# Patient Record
Sex: FEMALE | Race: WHITE | NOT HISPANIC OR LATINO
[De-identification: names, ages, dates, MRNs, and addresses within clinical notes are randomized per-mention and may not be internally consistent; named-entity substitution may affect disease eponyms.]

---

## 2017-01-06 RX ORDER — NORGESTIMATE AND ETHINYL ESTRADIOL 0.25-0.035
0.25-35 KIT ORAL
Qty: 30 | Refills: 11 | Status: ACTIVE | COMMUNITY
Start: 2017-01-06 | End: 1900-01-01

## 2017-02-06 ENCOUNTER — APPOINTMENT (OUTPATIENT)
Dept: OBGYN | Facility: CLINIC | Age: 27
End: 2017-02-06

## 2017-02-06 VITALS
WEIGHT: 108 LBS | HEIGHT: 63 IN | BODY MASS INDEX: 19.14 KG/M2 | SYSTOLIC BLOOD PRESSURE: 115 MMHG | DIASTOLIC BLOOD PRESSURE: 72 MMHG

## 2017-02-06 DIAGNOSIS — Z30.431 ENCOUNTER FOR ROUTINE CHECKING OF INTRAUTERINE CONTRACEPTIVE DEVICE: ICD-10-CM

## 2017-05-16 ENCOUNTER — RESULT REVIEW (OUTPATIENT)
Age: 27
End: 2017-05-16

## 2017-10-17 ENCOUNTER — APPOINTMENT (OUTPATIENT)
Dept: OTOLARYNGOLOGY | Facility: CLINIC | Age: 27
End: 2017-10-17
Payer: COMMERCIAL

## 2017-10-17 VITALS
DIASTOLIC BLOOD PRESSURE: 72 MMHG | WEIGHT: 108 LBS | HEIGHT: 63 IN | HEART RATE: 74 BPM | SYSTOLIC BLOOD PRESSURE: 104 MMHG | BODY MASS INDEX: 19.14 KG/M2

## 2017-10-17 DIAGNOSIS — J34.89 OTHER SPECIFIED DISORDERS OF NOSE AND NASAL SINUSES: ICD-10-CM

## 2017-10-17 PROCEDURE — 99204 OFFICE O/P NEW MOD 45 MIN: CPT | Mod: 25

## 2017-10-17 PROCEDURE — 31231 NASAL ENDOSCOPY DX: CPT

## 2017-10-17 RX ORDER — AMOXICILLIN 875 MG/1
875 TABLET, FILM COATED ORAL TWICE DAILY
Qty: 20 | Refills: 0 | Status: ACTIVE | COMMUNITY
Start: 2017-10-17 | End: 1900-01-01

## 2017-10-17 RX ORDER — AMOXICILLIN AND CLAVULANATE POTASSIUM 875; 125 MG/1; MG/1
875-125 TABLET, COATED ORAL
Qty: 14 | Refills: 0 | Status: ACTIVE | COMMUNITY
Start: 2017-08-14

## 2017-10-17 RX ORDER — AZITHROMYCIN 250 MG/1
250 TABLET, FILM COATED ORAL
Qty: 6 | Refills: 0 | Status: ACTIVE | COMMUNITY
Start: 2017-06-15

## 2017-10-17 RX ORDER — FLUTICASONE PROPIONATE 50 UG/1
50 SPRAY, METERED NASAL DAILY
Qty: 16 | Refills: 3 | Status: ACTIVE | COMMUNITY
Start: 2017-10-17 | End: 1900-01-01

## 2017-10-17 RX ORDER — LEVONORGESTREL 52 MG/1
INTRAUTERINE DEVICE INTRAUTERINE
Refills: 0 | Status: ACTIVE | COMMUNITY

## 2017-10-17 RX ORDER — AMOXICILLIN 500 MG/1
500 CAPSULE ORAL
Qty: 21 | Refills: 0 | Status: ACTIVE | COMMUNITY
Start: 2017-06-15

## 2017-10-17 RX ORDER — METHYLPREDNISOLONE 4 MG/1
4 TABLET ORAL
Qty: 1 | Refills: 0 | Status: ACTIVE | COMMUNITY
Start: 2017-10-17 | End: 1900-01-01

## 2017-11-20 ENCOUNTER — APPOINTMENT (OUTPATIENT)
Dept: OTOLARYNGOLOGY | Facility: CLINIC | Age: 27
End: 2017-11-20
Payer: COMMERCIAL

## 2017-11-20 VITALS
DIASTOLIC BLOOD PRESSURE: 73 MMHG | SYSTOLIC BLOOD PRESSURE: 104 MMHG | WEIGHT: 108 LBS | HEART RATE: 88 BPM | HEIGHT: 63 IN | BODY MASS INDEX: 19.14 KG/M2

## 2017-11-20 DIAGNOSIS — J34.2 DEVIATED NASAL SEPTUM: ICD-10-CM

## 2017-11-20 DIAGNOSIS — J32.4 CHRONIC PANSINUSITIS: ICD-10-CM

## 2017-11-20 PROCEDURE — 31231 NASAL ENDOSCOPY DX: CPT

## 2017-11-20 PROCEDURE — 99214 OFFICE O/P EST MOD 30 MIN: CPT | Mod: 25

## 2018-05-15 ENCOUNTER — RESULT REVIEW (OUTPATIENT)
Age: 28
End: 2018-05-15

## 2019-09-18 ENCOUNTER — RESULT REVIEW (OUTPATIENT)
Age: 29
End: 2019-09-18

## 2020-02-14 ENCOUNTER — ASOB RESULT (OUTPATIENT)
Age: 30
End: 2020-02-14

## 2020-02-14 ENCOUNTER — APPOINTMENT (OUTPATIENT)
Dept: ANTEPARTUM | Facility: CLINIC | Age: 30
End: 2020-02-14
Payer: COMMERCIAL

## 2020-02-14 PROCEDURE — 76811 OB US DETAILED SNGL FETUS: CPT

## 2020-04-27 ENCOUNTER — APPOINTMENT (OUTPATIENT)
Dept: MATERNAL FETAL MEDICINE | Facility: CLINIC | Age: 30
End: 2020-04-27
Payer: COMMERCIAL

## 2020-04-27 DIAGNOSIS — O24.419 GESTATIONAL DIABETES MELLITUS IN PREGNANCY, UNSPECIFIED CONTROL: ICD-10-CM

## 2020-04-27 PROCEDURE — G0109 DIAB MANAGE TRN IND/GROUP: CPT | Mod: 95

## 2020-04-27 RX ORDER — BLOOD-GLUCOSE METER
W/DEVICE KIT MISCELLANEOUS
Qty: 1 | Refills: 0 | Status: ACTIVE | COMMUNITY
Start: 2020-04-27 | End: 1900-01-01

## 2020-05-11 ENCOUNTER — APPOINTMENT (OUTPATIENT)
Dept: MATERNAL FETAL MEDICINE | Facility: CLINIC | Age: 30
End: 2020-05-11
Payer: COMMERCIAL

## 2020-05-11 PROCEDURE — 98967 PH1 ASSMT&MGMT NQHP 11-20: CPT

## 2020-05-22 ENCOUNTER — TRANSCRIPTION ENCOUNTER (OUTPATIENT)
Age: 30
End: 2020-05-22

## 2020-05-22 RX ORDER — BLOOD-GLUCOSE METER
KIT MISCELLANEOUS
Qty: 2 | Refills: 2 | Status: ACTIVE | COMMUNITY
Start: 2020-04-27 | End: 1900-01-01

## 2020-05-22 RX ORDER — LANCETS 33 GAUGE
EACH MISCELLANEOUS
Qty: 1 | Refills: 3 | Status: ACTIVE | COMMUNITY
Start: 2020-04-27 | End: 1900-01-01

## 2020-06-01 ENCOUNTER — ASOB RESULT (OUTPATIENT)
Age: 30
End: 2020-06-01

## 2020-06-01 ENCOUNTER — APPOINTMENT (OUTPATIENT)
Dept: MATERNAL FETAL MEDICINE | Facility: CLINIC | Age: 30
End: 2020-06-01
Payer: COMMERCIAL

## 2020-06-01 PROCEDURE — 98967 PH1 ASSMT&MGMT NQHP 11-20: CPT

## 2020-06-09 ENCOUNTER — APPOINTMENT (OUTPATIENT)
Dept: ANTEPARTUM | Facility: CLINIC | Age: 30
End: 2020-06-09
Payer: COMMERCIAL

## 2020-06-09 ENCOUNTER — ASOB RESULT (OUTPATIENT)
Age: 30
End: 2020-06-09

## 2020-06-09 PROCEDURE — 76819 FETAL BIOPHYS PROFIL W/O NST: CPT

## 2020-06-09 PROCEDURE — 76816 OB US FOLLOW-UP PER FETUS: CPT

## 2020-06-22 ENCOUNTER — INPATIENT (INPATIENT)
Facility: HOSPITAL | Age: 30
LOS: 1 days | Discharge: HOME | End: 2020-06-24
Attending: OBSTETRICS & GYNECOLOGY | Admitting: OBSTETRICS & GYNECOLOGY
Payer: COMMERCIAL

## 2020-06-22 VITALS
RESPIRATION RATE: 18 BRPM | SYSTOLIC BLOOD PRESSURE: 107 MMHG | HEART RATE: 81 BPM | TEMPERATURE: 99 F | OXYGEN SATURATION: 100 % | DIASTOLIC BLOOD PRESSURE: 58 MMHG

## 2020-06-22 DIAGNOSIS — Z3A.00 WEEKS OF GESTATION OF PREGNANCY NOT SPECIFIED: ICD-10-CM

## 2020-06-22 DIAGNOSIS — O26.899 OTHER SPECIFIED PREGNANCY RELATED CONDITIONS, UNSPECIFIED TRIMESTER: ICD-10-CM

## 2020-06-22 DIAGNOSIS — Z34.80 ENCOUNTER FOR SUPERVISION OF OTHER NORMAL PREGNANCY, UNSPECIFIED TRIMESTER: ICD-10-CM

## 2020-06-22 LAB
BASOPHILS # BLD AUTO: 0.04 K/UL — SIGNIFICANT CHANGE UP (ref 0–0.2)
BASOPHILS NFR BLD AUTO: 0.4 % — SIGNIFICANT CHANGE UP (ref 0–2)
BLD GP AB SCN SERPL QL: NEGATIVE — SIGNIFICANT CHANGE UP
EOSINOPHIL # BLD AUTO: 0.05 K/UL — SIGNIFICANT CHANGE UP (ref 0–0.5)
EOSINOPHIL NFR BLD AUTO: 0.5 % — SIGNIFICANT CHANGE UP (ref 0–6)
GLUCOSE BLDC GLUCOMTR-MCNC: 72 MG/DL — SIGNIFICANT CHANGE UP (ref 70–99)
GLUCOSE BLDC GLUCOMTR-MCNC: 97 MG/DL — SIGNIFICANT CHANGE UP (ref 70–99)
HCT VFR BLD CALC: 36.7 % — SIGNIFICANT CHANGE UP (ref 34.5–45)
HGB BLD-MCNC: 12.5 G/DL — SIGNIFICANT CHANGE UP (ref 11.5–15.5)
IMM GRANULOCYTES NFR BLD AUTO: 0.7 % — SIGNIFICANT CHANGE UP (ref 0–1.5)
LYMPHOCYTES # BLD AUTO: 1.99 K/UL — SIGNIFICANT CHANGE UP (ref 1–3.3)
LYMPHOCYTES # BLD AUTO: 19.2 % — SIGNIFICANT CHANGE UP (ref 13–44)
MCHC RBC-ENTMCNC: 29.9 PG — SIGNIFICANT CHANGE UP (ref 27–34)
MCHC RBC-ENTMCNC: 34.1 GM/DL — SIGNIFICANT CHANGE UP (ref 32–36)
MCV RBC AUTO: 87.8 FL — SIGNIFICANT CHANGE UP (ref 80–100)
MONOCYTES # BLD AUTO: 0.72 K/UL — SIGNIFICANT CHANGE UP (ref 0–0.9)
MONOCYTES NFR BLD AUTO: 7 % — SIGNIFICANT CHANGE UP (ref 2–14)
NEUTROPHILS # BLD AUTO: 7.47 K/UL — HIGH (ref 1.8–7.4)
NEUTROPHILS NFR BLD AUTO: 72.2 % — SIGNIFICANT CHANGE UP (ref 43–77)
NRBC # BLD: 0 /100 WBCS — SIGNIFICANT CHANGE UP (ref 0–0)
PLATELET # BLD AUTO: 148 K/UL — LOW (ref 150–400)
RBC # BLD: 4.18 M/UL — SIGNIFICANT CHANGE UP (ref 3.8–5.2)
RBC # FLD: 14.4 % — SIGNIFICANT CHANGE UP (ref 10.3–14.5)
RH IG SCN BLD-IMP: POSITIVE — SIGNIFICANT CHANGE UP
SARS-COV-2 RNA SPEC QL NAA+PROBE: SIGNIFICANT CHANGE UP
WBC # BLD: 10.34 K/UL — SIGNIFICANT CHANGE UP (ref 3.8–10.5)
WBC # FLD AUTO: 10.34 K/UL — SIGNIFICANT CHANGE UP (ref 3.8–10.5)

## 2020-06-22 RX ORDER — KETOROLAC TROMETHAMINE 30 MG/ML
30 SYRINGE (ML) INJECTION ONCE
Refills: 0 | Status: DISCONTINUED | OUTPATIENT
Start: 2020-06-22 | End: 2020-06-23

## 2020-06-22 RX ORDER — HYDROCORTISONE 1 %
1 OINTMENT (GRAM) TOPICAL EVERY 6 HOURS
Refills: 0 | Status: DISCONTINUED | OUTPATIENT
Start: 2020-06-22 | End: 2020-06-24

## 2020-06-22 RX ORDER — PRAMOXINE HYDROCHLORIDE 150 MG/15G
1 AEROSOL, FOAM RECTAL EVERY 4 HOURS
Refills: 0 | Status: DISCONTINUED | OUTPATIENT
Start: 2020-06-22 | End: 2020-06-24

## 2020-06-22 RX ORDER — TETANUS TOXOID, REDUCED DIPHTHERIA TOXOID AND ACELLULAR PERTUSSIS VACCINE, ADSORBED 5; 2.5; 8; 8; 2.5 [IU]/.5ML; [IU]/.5ML; UG/.5ML; UG/.5ML; UG/.5ML
0.5 SUSPENSION INTRAMUSCULAR ONCE
Refills: 0 | Status: DISCONTINUED | OUTPATIENT
Start: 2020-06-22 | End: 2020-06-24

## 2020-06-22 RX ORDER — SODIUM CHLORIDE 9 MG/ML
1000 INJECTION INTRAMUSCULAR; INTRAVENOUS; SUBCUTANEOUS
Refills: 0 | Status: DISCONTINUED | OUTPATIENT
Start: 2020-06-22 | End: 2020-06-22

## 2020-06-22 RX ORDER — SODIUM CHLORIDE 9 MG/ML
3 INJECTION INTRAMUSCULAR; INTRAVENOUS; SUBCUTANEOUS EVERY 8 HOURS
Refills: 0 | Status: DISCONTINUED | OUTPATIENT
Start: 2020-06-22 | End: 2020-06-24

## 2020-06-22 RX ORDER — SODIUM CHLORIDE 9 MG/ML
1000 INJECTION, SOLUTION INTRAVENOUS
Refills: 0 | Status: DISCONTINUED | OUTPATIENT
Start: 2020-06-22 | End: 2020-06-22

## 2020-06-22 RX ORDER — OXYTOCIN 10 UNIT/ML
333.33 VIAL (ML) INJECTION
Qty: 20 | Refills: 0 | Status: DISCONTINUED | OUTPATIENT
Start: 2020-06-22 | End: 2020-06-24

## 2020-06-22 RX ORDER — SIMETHICONE 80 MG/1
80 TABLET, CHEWABLE ORAL EVERY 4 HOURS
Refills: 0 | Status: DISCONTINUED | OUTPATIENT
Start: 2020-06-22 | End: 2020-06-24

## 2020-06-22 RX ORDER — BENZOCAINE 10 %
1 GEL (GRAM) MUCOUS MEMBRANE EVERY 6 HOURS
Refills: 0 | Status: DISCONTINUED | OUTPATIENT
Start: 2020-06-22 | End: 2020-06-24

## 2020-06-22 RX ORDER — DIBUCAINE 1 %
1 OINTMENT (GRAM) RECTAL EVERY 6 HOURS
Refills: 0 | Status: DISCONTINUED | OUTPATIENT
Start: 2020-06-22 | End: 2020-06-24

## 2020-06-22 RX ORDER — OXYCODONE HYDROCHLORIDE 5 MG/1
5 TABLET ORAL ONCE
Refills: 0 | Status: DISCONTINUED | OUTPATIENT
Start: 2020-06-22 | End: 2020-06-24

## 2020-06-22 RX ORDER — OXYTOCIN 10 UNIT/ML
333.33 VIAL (ML) INJECTION
Qty: 20 | Refills: 0 | Status: DISCONTINUED | OUTPATIENT
Start: 2020-06-22 | End: 2020-06-22

## 2020-06-22 RX ORDER — LANOLIN
1 OINTMENT (GRAM) TOPICAL EVERY 6 HOURS
Refills: 0 | Status: DISCONTINUED | OUTPATIENT
Start: 2020-06-22 | End: 2020-06-24

## 2020-06-22 RX ORDER — OXYTOCIN 10 UNIT/ML
6 VIAL (ML) INJECTION
Qty: 30 | Refills: 0 | Status: DISCONTINUED | OUTPATIENT
Start: 2020-06-22 | End: 2020-06-22

## 2020-06-22 RX ORDER — AMPICILLIN TRIHYDRATE 250 MG
2 CAPSULE ORAL ONCE
Refills: 0 | Status: COMPLETED | OUTPATIENT
Start: 2020-06-22 | End: 2020-06-22

## 2020-06-22 RX ORDER — IBUPROFEN 200 MG
600 TABLET ORAL EVERY 6 HOURS
Refills: 0 | Status: COMPLETED | OUTPATIENT
Start: 2020-06-22 | End: 2021-05-21

## 2020-06-22 RX ORDER — AMPICILLIN TRIHYDRATE 250 MG
1 CAPSULE ORAL EVERY 4 HOURS
Refills: 0 | Status: DISCONTINUED | OUTPATIENT
Start: 2020-06-22 | End: 2020-06-22

## 2020-06-22 RX ORDER — AER TRAVELER 0.5 G/1
1 SOLUTION RECTAL; TOPICAL EVERY 4 HOURS
Refills: 0 | Status: DISCONTINUED | OUTPATIENT
Start: 2020-06-22 | End: 2020-06-24

## 2020-06-22 RX ORDER — DIPHENHYDRAMINE HCL 50 MG
25 CAPSULE ORAL EVERY 6 HOURS
Refills: 0 | Status: DISCONTINUED | OUTPATIENT
Start: 2020-06-22 | End: 2020-06-24

## 2020-06-22 RX ORDER — ACETAMINOPHEN 500 MG
975 TABLET ORAL
Refills: 0 | Status: DISCONTINUED | OUTPATIENT
Start: 2020-06-22 | End: 2020-06-24

## 2020-06-22 RX ORDER — CITRIC ACID/SODIUM CITRATE 300-500 MG
15 SOLUTION, ORAL ORAL EVERY 6 HOURS
Refills: 0 | Status: DISCONTINUED | OUTPATIENT
Start: 2020-06-22 | End: 2020-06-22

## 2020-06-22 RX ORDER — OXYCODONE HYDROCHLORIDE 5 MG/1
5 TABLET ORAL
Refills: 0 | Status: DISCONTINUED | OUTPATIENT
Start: 2020-06-22 | End: 2020-06-24

## 2020-06-22 RX ORDER — MAGNESIUM HYDROXIDE 400 MG/1
30 TABLET, CHEWABLE ORAL
Refills: 0 | Status: DISCONTINUED | OUTPATIENT
Start: 2020-06-22 | End: 2020-06-24

## 2020-06-22 RX ADMIN — SODIUM CHLORIDE 125 MILLILITER(S): 9 INJECTION, SOLUTION INTRAVENOUS at 21:08

## 2020-06-22 RX ADMIN — Medication 216 GRAM(S): at 19:17

## 2020-06-22 RX ADMIN — Medication 6 MILLIUNIT(S)/MIN: at 20:20

## 2020-06-22 RX ADMIN — SODIUM CHLORIDE 125 MILLILITER(S): 9 INJECTION INTRAMUSCULAR; INTRAVENOUS; SUBCUTANEOUS at 21:08

## 2020-06-23 ENCOUNTER — APPOINTMENT (OUTPATIENT)
Dept: ANTEPARTUM | Facility: CLINIC | Age: 30
End: 2020-06-23

## 2020-06-23 LAB
SARS-COV-2 IGG SERPL QL IA: NEGATIVE — SIGNIFICANT CHANGE UP
SARS-COV-2 IGM SERPL IA-ACNC: 0.14 INDEX — SIGNIFICANT CHANGE UP
T PALLIDUM AB TITR SER: NEGATIVE — SIGNIFICANT CHANGE UP

## 2020-06-23 RX ORDER — IBUPROFEN 200 MG
600 TABLET ORAL EVERY 6 HOURS
Refills: 0 | Status: DISCONTINUED | OUTPATIENT
Start: 2020-06-23 | End: 2020-06-24

## 2020-06-23 RX ADMIN — Medication 1000 MILLIUNIT(S)/MIN: at 00:10

## 2020-06-23 RX ADMIN — Medication 600 MILLIGRAM(S): at 14:57

## 2020-06-23 RX ADMIN — Medication 1 TABLET(S): at 14:56

## 2020-06-23 RX ADMIN — Medication 600 MILLIGRAM(S): at 20:47

## 2020-06-23 RX ADMIN — Medication 975 MILLIGRAM(S): at 12:04

## 2020-06-23 RX ADMIN — Medication 975 MILLIGRAM(S): at 17:56

## 2020-06-23 RX ADMIN — Medication 600 MILLIGRAM(S): at 08:43

## 2020-06-23 RX ADMIN — Medication 975 MILLIGRAM(S): at 05:10

## 2020-06-23 RX ADMIN — Medication 30 MILLIGRAM(S): at 00:26

## 2020-06-23 NOTE — DIETITIAN INITIAL EVALUATION ADULT. - PERTINENT MEDS FT
MEDICATIONS  (STANDING):  acetaminophen   Tablet .. 975 milliGRAM(s) Oral <User Schedule>  diphtheria/tetanus/pertussis (acellular) Vaccine (ADAcel) 0.5 milliLiter(s) IntraMuscular once  ibuprofen  Tablet. 600 milliGRAM(s) Oral every 6 hours  oxytocin Infusion 333.333 milliUNIT(s)/Min (1000 mL/Hr) IV Continuous <Continuous>  prenatal multivitamin 1 Tablet(s) Oral daily  sodium chloride 0.9% lock flush 3 milliLiter(s) IV Push every 8 hours    MEDICATIONS  (PRN):  benzocaine 20%/menthol 0.5% Spray 1 Spray(s) Topical every 6 hours PRN for Perineal discomfort  dibucaine 1% Ointment 1 Application(s) Topical every 6 hours PRN Perineal discomfort  diphenhydrAMINE 25 milliGRAM(s) Oral every 6 hours PRN Pruritus  hydrocortisone 1% Cream 1 Application(s) Topical every 6 hours PRN Moderate Pain (4-6)  lanolin Ointment 1 Application(s) Topical every 6 hours PRN nipple soreness  magnesium hydroxide Suspension 30 milliLiter(s) Oral two times a day PRN Constipation  oxyCODONE    IR 5 milliGRAM(s) Oral every 3 hours PRN Moderate to Severe Pain (4-10)  oxyCODONE    IR 5 milliGRAM(s) Oral once PRN Moderate to Severe Pain (4-10)  pramoxine 1%/zinc 5% Cream 1 Application(s) Topical every 4 hours PRN Moderate Pain (4-6)  simethicone 80 milliGRAM(s) Chew every 4 hours PRN Gas  witch hazel Pads 1 Application(s) Topical every 4 hours PRN Perineal discomfort

## 2020-06-23 NOTE — DIETITIAN INITIAL EVALUATION ADULT. - OTHER INFO
Pt is 28yo  s/p  at 39.1 weeks. Plans on breast feeding  male.  Good appetite reported. No c/o nausea, vomiting, diarrhea, or constipation. No BM since delivery.  Pt endorses following a CSTCHOGDM therapeutic diet while pregnant. No medications for BG control during pregnancy. Pt reports this was her first pregnancy with GDM.  Pt endorses taking a prenatal multivitamin while pregnant. NFKA reported.    Nutrition education provided: Pt educated on postpartum dietary recommendations including: risk of development of T2DM, ways to reduce risk of developing T2DM and reinforced importance of DM screening 4-12 weeks postpartum. Pt verbalized good understanding. Written materials left at bedside.

## 2020-06-23 NOTE — DIETITIAN INITIAL EVALUATION ADULT. - PHYSICAL APPEARANCE
other (specify)/well nourished Height: 63 inches, Weight: 105 pounds (pre-pregnancy)  BMI: 18.6 kg/m2 IBW: 115 pounds (+/-10%), %IBW: 91%  Pertinent Info: No edema noted, no pressure injuries noted at this time in nursing flow sheet.

## 2020-06-23 NOTE — PROGRESS NOTE ADULT - SUBJECTIVE AND OBJECTIVE BOX
Postpartum Note- PPD#1    Allergies    No Known Allergies    Intolerances          RPR Negative  Blood Type  O  --  Positive  Rubella immune    S:Patient is a  29y         PPD#1         S/P      Patient w/o complaints, pain is controlled.    Pt is OOB, tolerating PO, passing flatus. Lochia WNL.     O:  Vital Signs Last 24 Hrs  T(C): 36.7 (2020 03:45), Max: 37.1 (2020 23:40)  T(F): 98.1 (2020 03:45), Max: 98.8 (2020 23:40)  HR: 62 (2020 03:45) (58 - 81)  BP: 106/67 (2020 03:45) (106/67 - 125/64)  BP(mean): 93 (2020 01:40) (82 - 93)  RR: 18 (2020 03:45) (16 - 20)  SpO2: 100% (2020 03:45) (98% - 100%)     Gen: NAD  Abdomen: Soft, nontender, non-distended, fundus firm.  Lochia WNL  Ext: Neg edema, Neg calf tenderness    LABS:    Hemoglobin: 12.5 g/dL (20 @ 19:31)      Hematocrit: 36.7 % (20 @ 19:31)

## 2020-06-24 ENCOUNTER — TRANSCRIPTION ENCOUNTER (OUTPATIENT)
Age: 30
End: 2020-06-24

## 2020-06-24 VITALS
OXYGEN SATURATION: 98 % | DIASTOLIC BLOOD PRESSURE: 63 MMHG | TEMPERATURE: 98 F | HEART RATE: 58 BPM | RESPIRATION RATE: 1 BRPM | SYSTOLIC BLOOD PRESSURE: 100 MMHG

## 2020-06-24 PROCEDURE — 86769 SARS-COV-2 COVID-19 ANTIBODY: CPT

## 2020-06-24 PROCEDURE — 59025 FETAL NON-STRESS TEST: CPT

## 2020-06-24 PROCEDURE — 82962 GLUCOSE BLOOD TEST: CPT

## 2020-06-24 PROCEDURE — 85027 COMPLETE CBC AUTOMATED: CPT

## 2020-06-24 PROCEDURE — G0463: CPT

## 2020-06-24 PROCEDURE — 86780 TREPONEMA PALLIDUM: CPT

## 2020-06-24 PROCEDURE — 59050 FETAL MONITOR W/REPORT: CPT

## 2020-06-24 RX ORDER — ACETAMINOPHEN 500 MG
3 TABLET ORAL
Qty: 0 | Refills: 0 | DISCHARGE
Start: 2020-06-24

## 2020-06-24 RX ORDER — IBUPROFEN 200 MG
1 TABLET ORAL
Qty: 0 | Refills: 0 | DISCHARGE
Start: 2020-06-24

## 2020-06-24 RX ADMIN — Medication 600 MILLIGRAM(S): at 03:58

## 2020-06-24 RX ADMIN — Medication 600 MILLIGRAM(S): at 09:45

## 2020-06-24 RX ADMIN — Medication 975 MILLIGRAM(S): at 00:02

## 2020-06-24 NOTE — DISCHARGE NOTE OB - PATIENT PORTAL LINK FT
You can access the FollowMyHealth Patient Portal offered by [facility] by registering at the following website: [email or web address]. By joining [facility]’s FollowMyHealth portal, you will also be able to view your health information using other applications (apps) compatible with our system.

## 2020-06-24 NOTE — DISCHARGE NOTE OB - CARE PLAN
Principal Discharge DX:	Vaginal delivery  Goal:	recovery  Assessment and plan of treatment:	return to baseline health, regular diet, pain control, follow up with Dr Perez

## 2020-06-24 NOTE — PROGRESS NOTE ADULT - ASSESSMENT
Per Dr. Perez-  Abdomen: Soft, nontender, non-distended, fundus firm.  Vaginal: Lochia WNL.   Ext: Neg edema, Neg calf tenderness

## 2020-06-24 NOTE — PROGRESS NOTE ADULT - SUBJECTIVE AND OBJECTIVE BOX
Postpartum Note- PPD#2      S:Patient w/o complaints, pain is controlled.    Pt is OOB, tolerating PO, voiding. Vaginal bleeding mild to moderate.       O:  Vital Signs Last 24 Hrs  T(C): 36.7 (24 Jun 2020 05:17), Max: 36.8 (23 Jun 2020 18:00)  T(F): 98 (24 Jun 2020 05:17), Max: 98.2 (23 Jun 2020 18:00)  HR: 58 (24 Jun 2020 05:17) (58 - 67)  BP: 100/63 (24 Jun 2020 05:17) (92/60 - 100/63)  BP(mean): --  RR: 1 (24 Jun 2020 05:17) (1 - 18)  SpO2: 98% (24 Jun 2020 05:17) (97% - 99%)         LABS:    Hemoglobin: 12.5 g/dL (06-22 @ 19:31)      Hematocrit: 36.7 % (06-22 @ 19:31)

## 2020-06-24 NOTE — DISCHARGE NOTE OB - CARE PROVIDER_API CALL
Mckayla Perez  OBSTETRICS AND GYNECOLOGY  [address]  [address]  [place] [address]  Phone: [phone]  Fax: [phone]  Follow Up Time:

## 2021-03-10 ENCOUNTER — RESULT REVIEW (OUTPATIENT)
Age: 31
End: 2021-03-10

## 2022-06-15 ENCOUNTER — RESULT REVIEW (OUTPATIENT)
Age: 32
End: 2022-06-15

## 2022-12-21 ENCOUNTER — APPOINTMENT (OUTPATIENT)
Dept: ANTEPARTUM | Facility: CLINIC | Age: 32
End: 2022-12-21

## 2022-12-21 ENCOUNTER — ASOB RESULT (OUTPATIENT)
Age: 32
End: 2022-12-21

## 2022-12-21 PROCEDURE — 76811 OB US DETAILED SNGL FETUS: CPT

## 2023-04-28 ENCOUNTER — INPATIENT (INPATIENT)
Facility: HOSPITAL | Age: 33
LOS: 1 days | Discharge: HOME | End: 2023-04-30
Attending: OBSTETRICS & GYNECOLOGY | Admitting: OBSTETRICS & GYNECOLOGY
Payer: COMMERCIAL

## 2023-04-28 VITALS
DIASTOLIC BLOOD PRESSURE: 74 MMHG | RESPIRATION RATE: 18 BRPM | HEART RATE: 86 BPM | SYSTOLIC BLOOD PRESSURE: 119 MMHG | TEMPERATURE: 99 F

## 2023-04-28 DIAGNOSIS — O26.899 OTHER SPECIFIED PREGNANCY RELATED CONDITIONS, UNSPECIFIED TRIMESTER: ICD-10-CM

## 2023-04-28 DIAGNOSIS — Z34.80 ENCOUNTER FOR SUPERVISION OF OTHER NORMAL PREGNANCY, UNSPECIFIED TRIMESTER: ICD-10-CM

## 2023-04-28 LAB
BASOPHILS # BLD AUTO: 0.07 K/UL — SIGNIFICANT CHANGE UP (ref 0–0.2)
BASOPHILS NFR BLD AUTO: 0.5 % — SIGNIFICANT CHANGE UP (ref 0–2)
BLD GP AB SCN SERPL QL: NEGATIVE — SIGNIFICANT CHANGE UP
EOSINOPHIL # BLD AUTO: 0.19 K/UL — SIGNIFICANT CHANGE UP (ref 0–0.5)
EOSINOPHIL NFR BLD AUTO: 1.4 % — SIGNIFICANT CHANGE UP (ref 0–6)
HCT VFR BLD CALC: 38.3 % — SIGNIFICANT CHANGE UP (ref 34.5–45)
HGB BLD-MCNC: 13.2 G/DL — SIGNIFICANT CHANGE UP (ref 11.5–15.5)
IMM GRANULOCYTES NFR BLD AUTO: 1.2 % — HIGH (ref 0–0.9)
LYMPHOCYTES # BLD AUTO: 15.8 % — SIGNIFICANT CHANGE UP (ref 13–44)
LYMPHOCYTES # BLD AUTO: 2.21 K/UL — SIGNIFICANT CHANGE UP (ref 1–3.3)
MCHC RBC-ENTMCNC: 31.4 PG — SIGNIFICANT CHANGE UP (ref 27–34)
MCHC RBC-ENTMCNC: 34.5 GM/DL — SIGNIFICANT CHANGE UP (ref 32–36)
MCV RBC AUTO: 91.2 FL — SIGNIFICANT CHANGE UP (ref 80–100)
MONOCYTES # BLD AUTO: 0.93 K/UL — HIGH (ref 0–0.9)
MONOCYTES NFR BLD AUTO: 6.7 % — SIGNIFICANT CHANGE UP (ref 2–14)
NEUTROPHILS # BLD AUTO: 10.39 K/UL — HIGH (ref 1.8–7.4)
NEUTROPHILS NFR BLD AUTO: 74.4 % — SIGNIFICANT CHANGE UP (ref 43–77)
NRBC # BLD: 0 /100 WBCS — SIGNIFICANT CHANGE UP (ref 0–0)
PLATELET # BLD AUTO: 188 K/UL — SIGNIFICANT CHANGE UP (ref 150–400)
RBC # BLD: 4.2 M/UL — SIGNIFICANT CHANGE UP (ref 3.8–5.2)
RBC # FLD: 13.7 % — SIGNIFICANT CHANGE UP (ref 10.3–14.5)
RH IG SCN BLD-IMP: POSITIVE — SIGNIFICANT CHANGE UP
WBC # BLD: 13.96 K/UL — HIGH (ref 3.8–10.5)
WBC # FLD AUTO: 13.96 K/UL — HIGH (ref 3.8–10.5)

## 2023-04-28 RX ORDER — SODIUM CHLORIDE 9 MG/ML
1000 INJECTION, SOLUTION INTRAVENOUS
Refills: 0 | Status: DISCONTINUED | OUTPATIENT
Start: 2023-04-28 | End: 2023-04-29

## 2023-04-28 RX ORDER — CHLORHEXIDINE GLUCONATE 213 G/1000ML
1 SOLUTION TOPICAL ONCE
Refills: 0 | Status: DISCONTINUED | OUTPATIENT
Start: 2023-04-28 | End: 2023-04-29

## 2023-04-28 RX ORDER — AMPICILLIN TRIHYDRATE 250 MG
1 CAPSULE ORAL EVERY 4 HOURS
Refills: 0 | Status: DISCONTINUED | OUTPATIENT
Start: 2023-04-28 | End: 2023-04-29

## 2023-04-28 RX ORDER — AMPICILLIN TRIHYDRATE 250 MG
2 CAPSULE ORAL ONCE
Refills: 0 | Status: COMPLETED | OUTPATIENT
Start: 2023-04-28 | End: 2023-04-28

## 2023-04-28 RX ORDER — OXYTOCIN 10 UNIT/ML
333.33 VIAL (ML) INJECTION
Qty: 20 | Refills: 0 | Status: DISCONTINUED | OUTPATIENT
Start: 2023-04-28 | End: 2023-04-30

## 2023-04-28 RX ORDER — CITRIC ACID/SODIUM CITRATE 300-500 MG
15 SOLUTION, ORAL ORAL EVERY 6 HOURS
Refills: 0 | Status: DISCONTINUED | OUTPATIENT
Start: 2023-04-28 | End: 2023-04-29

## 2023-04-28 RX ORDER — OXYTOCIN 10 UNIT/ML
4 VIAL (ML) INJECTION
Qty: 30 | Refills: 0 | Status: DISCONTINUED | OUTPATIENT
Start: 2023-04-28 | End: 2023-04-29

## 2023-04-28 RX ADMIN — Medication 200 GRAM(S): at 21:14

## 2023-04-28 RX ADMIN — Medication 4 MILLIUNIT(S)/MIN: at 22:42

## 2023-04-28 RX ADMIN — SODIUM CHLORIDE 125 MILLILITER(S): 9 INJECTION, SOLUTION INTRAVENOUS at 21:15

## 2023-04-28 NOTE — OB PROVIDER DELIVERY SUMMARY - NSPROVIDERDELIVERYNOTE_OBGYN_ALL_OB_FT
, pt delivered of a viable female infant apgar 9/9, placenta complete and uterus explored. small second degree and very small periurethral laceration. ebl 125 cc.

## 2023-04-28 NOTE — OB PROVIDER H&P - ASSESSMENT
A/P: 33yo  @ 39w2d here with ROM in early labor  - Admit to L&D  - Routine labs   - EFM/TOCO: resuscitative measures prn  - GBS (+): Ampicillin ordered  - Anesthesia consult for epidural  - Expect     d/w Dr. Chris Padilla, PA-C

## 2023-04-28 NOTE — OB PROVIDER H&P - NSLOWPPHRISK_OBGYN_A_OB
No previous uterine incision/[place] Pregnancy/Less than or equal to 4 previous vaginal births/No known bleeding disorder/No history of postpartum hemorrhage/No other PPH risks indicated

## 2023-04-28 NOTE — OB PROVIDER H&P - HISTORY OF PRESENT ILLNESS
Pt is a 33yo  @ 39w2d here with ROM, clear since 7p. Pt. denies VB, CTX. +FM. PNC uncomplicated GBS (+) EFW 3000g    OBHx: : EMEKA, FT, 6#11; : LAKSHMI, FT, 6#14; 2020: LAKSHMI, LT, 6#9  GYNHx: denies ovarian cysts, fibroids, hx of abnormal pap or STDs  PMHx: denies  PSurgHx: denies  Allergies: NKDA  Meds: PNV  Social: No smoking, alcohol, or illicit drug use  Psych: No hx of anxiety or depression

## 2023-04-28 NOTE — OB PROVIDER IHI INDUCTION/AUGMENTATION NOTE - LABOR: CERVICAL POSITION
Problem: Patient Care Overview  Goal: Plan of Care/Patient Progress Review  Outcome: Improving  Assumed cares of patient from [id number].  VSS.  Pain well controlled; declined medications.  Incision CDI.  +BS.  Not passing flatus.  Tolerating reg diet.  Fundal checks WNL.  Voiding adequate amounts.  Encouraged patient to increase fluids to increase UOP.  Breastfeeding independently.  FOB present.  Continue to monitor.       posterior

## 2023-04-28 NOTE — OB PROVIDER H&P - NSHPPHYSICALEXAM_GEN_ALL_CORE
PE:    T(C): 37.1 (04-28-23 @ 20:07), Max: 37.1 (04-28-23 @ 20:04)  HR: 92 (04-28-23 @ 20:31) (86 - 109)  BP: 119/74 (04-28-23 @ 20:07) (119/74 - 119/74)  RR: 18 (04-28-23 @ 20:07) (18 - 18)  SpO2: 97% (04-28-23 @ 20:31) (87% - 100%)    General: NAD, A&Ox3  CV: RRR  Lungs: Clear bilat   Abd: soft, nontender, gravid  SSE: +gross pooling  VE: 4/70/-3  Bedside sono: Vertex  EFM: 140/moderate variablity/+accels/-decels  TOCO: q3-5mins

## 2023-04-29 LAB
COVID-19 SPIKE DOMAIN AB INTERP: POSITIVE
COVID-19 SPIKE DOMAIN ANTIBODY RESULT: >250 U/ML — HIGH
SARS-COV-2 IGG+IGM SERPL QL IA: >250 U/ML — HIGH
SARS-COV-2 IGG+IGM SERPL QL IA: POSITIVE
T PALLIDUM AB TITR SER: NEGATIVE — SIGNIFICANT CHANGE UP

## 2023-04-29 RX ORDER — DIPHENHYDRAMINE HCL 50 MG
25 CAPSULE ORAL EVERY 6 HOURS
Refills: 0 | Status: DISCONTINUED | OUTPATIENT
Start: 2023-04-29 | End: 2023-04-30

## 2023-04-29 RX ORDER — IBUPROFEN 200 MG
600 TABLET ORAL EVERY 6 HOURS
Refills: 0 | Status: DISCONTINUED | OUTPATIENT
Start: 2023-04-29 | End: 2023-04-30

## 2023-04-29 RX ORDER — IBUPROFEN 200 MG
600 TABLET ORAL EVERY 6 HOURS
Refills: 0 | Status: COMPLETED | OUTPATIENT
Start: 2023-04-29 | End: 2024-03-27

## 2023-04-29 RX ORDER — DIBUCAINE 1 %
1 OINTMENT (GRAM) RECTAL EVERY 6 HOURS
Refills: 0 | Status: DISCONTINUED | OUTPATIENT
Start: 2023-04-29 | End: 2023-04-30

## 2023-04-29 RX ORDER — SODIUM CHLORIDE 9 MG/ML
3 INJECTION INTRAMUSCULAR; INTRAVENOUS; SUBCUTANEOUS EVERY 8 HOURS
Refills: 0 | Status: DISCONTINUED | OUTPATIENT
Start: 2023-04-29 | End: 2023-04-30

## 2023-04-29 RX ORDER — ACETAMINOPHEN 500 MG
975 TABLET ORAL
Refills: 0 | Status: DISCONTINUED | OUTPATIENT
Start: 2023-04-29 | End: 2023-04-30

## 2023-04-29 RX ORDER — OXYCODONE HYDROCHLORIDE 5 MG/1
5 TABLET ORAL ONCE
Refills: 0 | Status: DISCONTINUED | OUTPATIENT
Start: 2023-04-29 | End: 2023-04-30

## 2023-04-29 RX ORDER — OXYTOCIN 10 UNIT/ML
41.67 VIAL (ML) INJECTION
Qty: 20 | Refills: 0 | Status: DISCONTINUED | OUTPATIENT
Start: 2023-04-29 | End: 2023-04-30

## 2023-04-29 RX ORDER — SIMETHICONE 80 MG/1
80 TABLET, CHEWABLE ORAL EVERY 4 HOURS
Refills: 0 | Status: DISCONTINUED | OUTPATIENT
Start: 2023-04-29 | End: 2023-04-30

## 2023-04-29 RX ORDER — KETOROLAC TROMETHAMINE 30 MG/ML
30 SYRINGE (ML) INJECTION ONCE
Refills: 0 | Status: DISCONTINUED | OUTPATIENT
Start: 2023-04-29 | End: 2023-04-29

## 2023-04-29 RX ORDER — HYDROCORTISONE 1 %
1 OINTMENT (GRAM) TOPICAL EVERY 6 HOURS
Refills: 0 | Status: DISCONTINUED | OUTPATIENT
Start: 2023-04-29 | End: 2023-04-30

## 2023-04-29 RX ORDER — OXYCODONE HYDROCHLORIDE 5 MG/1
5 TABLET ORAL
Refills: 0 | Status: DISCONTINUED | OUTPATIENT
Start: 2023-04-29 | End: 2023-04-30

## 2023-04-29 RX ORDER — BENZOCAINE 10 %
1 GEL (GRAM) MUCOUS MEMBRANE EVERY 6 HOURS
Refills: 0 | Status: DISCONTINUED | OUTPATIENT
Start: 2023-04-29 | End: 2023-04-30

## 2023-04-29 RX ORDER — PRAMOXINE HYDROCHLORIDE 150 MG/15G
1 AEROSOL, FOAM RECTAL EVERY 4 HOURS
Refills: 0 | Status: DISCONTINUED | OUTPATIENT
Start: 2023-04-29 | End: 2023-04-30

## 2023-04-29 RX ORDER — OXYTOCIN 10 UNIT/ML
10 VIAL (ML) INJECTION ONCE
Refills: 0 | Status: COMPLETED | OUTPATIENT
Start: 2023-04-29 | End: 2023-04-29

## 2023-04-29 RX ORDER — MAGNESIUM HYDROXIDE 400 MG/1
30 TABLET, CHEWABLE ORAL
Refills: 0 | Status: DISCONTINUED | OUTPATIENT
Start: 2023-04-29 | End: 2023-04-30

## 2023-04-29 RX ORDER — AER TRAVELER 0.5 G/1
1 SOLUTION RECTAL; TOPICAL EVERY 4 HOURS
Refills: 0 | Status: DISCONTINUED | OUTPATIENT
Start: 2023-04-29 | End: 2023-04-30

## 2023-04-29 RX ORDER — LANOLIN
1 OINTMENT (GRAM) TOPICAL EVERY 6 HOURS
Refills: 0 | Status: DISCONTINUED | OUTPATIENT
Start: 2023-04-29 | End: 2023-04-30

## 2023-04-29 RX ORDER — TETANUS TOXOID, REDUCED DIPHTHERIA TOXOID AND ACELLULAR PERTUSSIS VACCINE, ADSORBED 5; 2.5; 8; 8; 2.5 [IU]/.5ML; [IU]/.5ML; UG/.5ML; UG/.5ML; UG/.5ML
0.5 SUSPENSION INTRAMUSCULAR ONCE
Refills: 0 | Status: DISCONTINUED | OUTPATIENT
Start: 2023-04-29 | End: 2023-04-30

## 2023-04-29 RX ADMIN — Medication 30 MILLIGRAM(S): at 02:43

## 2023-04-29 RX ADMIN — Medication 975 MILLIGRAM(S): at 15:18

## 2023-04-29 RX ADMIN — Medication 975 MILLIGRAM(S): at 21:40

## 2023-04-29 RX ADMIN — Medication 600 MILLIGRAM(S): at 06:30

## 2023-04-29 RX ADMIN — Medication 975 MILLIGRAM(S): at 09:42

## 2023-04-29 RX ADMIN — Medication 1 TABLET(S): at 14:09

## 2023-04-29 RX ADMIN — Medication 600 MILLIGRAM(S): at 14:09

## 2023-04-29 RX ADMIN — Medication 600 MILLIGRAM(S): at 06:03

## 2023-04-29 RX ADMIN — Medication 10 UNIT(S): at 00:01

## 2023-04-29 RX ADMIN — Medication 975 MILLIGRAM(S): at 15:48

## 2023-04-29 RX ADMIN — Medication 975 MILLIGRAM(S): at 10:12

## 2023-04-29 RX ADMIN — Medication 975 MILLIGRAM(S): at 20:34

## 2023-04-29 RX ADMIN — Medication 600 MILLIGRAM(S): at 14:39

## 2023-04-29 RX ADMIN — Medication 600 MILLIGRAM(S): at 23:20

## 2023-04-29 RX ADMIN — Medication 30 MILLIGRAM(S): at 01:46

## 2023-04-29 NOTE — OB RN DELIVERY SUMMARY - NS_SEPSISRSKCALC_OBGYN_ALL_OB_FT
EOS calculated successfully. EOS Risk Factor: 0.5/1000 live births ([facility] national incidence); GA=39w2d; Temp=98.8; ROM=4.933; GBS='Positive'; Antibiotics='GBS specific antibiotics > 2 hrs prior to birth'

## 2023-04-29 NOTE — PROGRESS NOTE ADULT - SUBJECTIVE AND OBJECTIVE BOX
Postpartum Note- PPD#1    Allergies: No Known Allergies    Blood Type  O Positive  Rubella Immune  RPR Non Reactive    S:Patient is a  32y  PPD#1 S/P    Patient w/o complaints, pain is controlled.    Pt is OOB, tolerating PO, passing flatus. Lochia WNL.     Feeding: Breast    O:  Vital Signs Last 24 Hrs  T(C): 36.7 (2023 05:09), Max: 37.2 (2023 00:07)  T(F): 98 (2023 05:09), Max: 99 (2023 00:07)  HR: 69 (2023 05:09) (65 - 171)  BP: 104/65 (2023 05:09) (100/55 - 136/68)  BP(mean): --  RR: 17 (2023 05:09) (17 - 18)  SpO2: 97% (2023 05:09) (83% - 100%)    Gen: NAD  Abdomen: Soft, nontender, non-distended, fundus firm.  Vaginal: Lochia WNL  Ext: Neg calf tenderness, neg edema    LABS:    Hemoglobin: 13.2 g/dL ( @ 21:15)      Hematocrit: 38.3 % ( @ 21:15)

## 2023-04-29 NOTE — PROGRESS NOTE ADULT - ASSESSMENT
A/P: 32y   PPD # 1 S/P  doing well    Current Issues: None    No pertinent past medical history    No significant past surgical history

## 2023-04-30 ENCOUNTER — TRANSCRIPTION ENCOUNTER (OUTPATIENT)
Age: 33
End: 2023-04-30

## 2023-04-30 VITALS
OXYGEN SATURATION: 98 % | HEART RATE: 64 BPM | RESPIRATION RATE: 18 BRPM | SYSTOLIC BLOOD PRESSURE: 103 MMHG | DIASTOLIC BLOOD PRESSURE: 65 MMHG | TEMPERATURE: 98 F

## 2023-04-30 PROCEDURE — 86900 BLOOD TYPING SEROLOGIC ABO: CPT

## 2023-04-30 PROCEDURE — 85025 COMPLETE CBC W/AUTO DIFF WBC: CPT

## 2023-04-30 PROCEDURE — 86780 TREPONEMA PALLIDUM: CPT

## 2023-04-30 PROCEDURE — 86850 RBC ANTIBODY SCREEN: CPT

## 2023-04-30 PROCEDURE — 86901 BLOOD TYPING SEROLOGIC RH(D): CPT

## 2023-04-30 PROCEDURE — 59050 FETAL MONITOR W/REPORT: CPT

## 2023-04-30 PROCEDURE — 36415 COLL VENOUS BLD VENIPUNCTURE: CPT

## 2023-04-30 PROCEDURE — 86769 SARS-COV-2 COVID-19 ANTIBODY: CPT

## 2023-04-30 RX ADMIN — Medication 975 MILLIGRAM(S): at 08:35

## 2023-04-30 RX ADMIN — Medication 600 MILLIGRAM(S): at 05:37

## 2023-04-30 RX ADMIN — Medication 975 MILLIGRAM(S): at 09:05

## 2023-04-30 RX ADMIN — Medication 600 MILLIGRAM(S): at 00:14

## 2023-04-30 RX ADMIN — Medication 1 TABLET(S): at 11:24

## 2023-04-30 RX ADMIN — Medication 600 MILLIGRAM(S): at 11:24

## 2023-04-30 RX ADMIN — Medication 600 MILLIGRAM(S): at 04:55

## 2023-04-30 RX ADMIN — Medication 600 MILLIGRAM(S): at 11:55

## 2023-04-30 NOTE — DISCHARGE NOTE OB - NS MD DC FALL RISK RISK
For information on Fall & Injury Prevention, visit: [email or web address] OR  [email or web address] OR  https://www.cdc.gov/steadi/patient.html

## 2023-04-30 NOTE — DISCHARGE NOTE OB - PATIENT PORTAL LINK FT
You can access the FollowMyHealth Patient Portal offered by [facility] by registering at the following website: [email or web address]. By joining [facility]’s FollowMyHealth portal, you will also be able to view your health information using other applications (apps) compatible with our system.

## 2023-04-30 NOTE — DISCHARGE NOTE OB - CARE PLAN
1 Principal Discharge DX:	Spontaneous vaginal delivery  Assessment and plan of treatment:	as per PMD

## 2023-04-30 NOTE — DISCHARGE NOTE OB - CARE PROVIDER_API CALL
Mckayla Perez  OBSTETRICS AND GYNECOLOGY  [address]  [place] [address]  Phone: [phone]  Fax: [phone]  Follow Up Time:

## 2023-04-30 NOTE — DISCHARGE NOTE OB - NPI NUMBER (FOR SYSADMIN USE ONLY) :
Concentration (Mg/Ml) Of Additional Medication: 2.5 Consent: The risks of atrophy were reviewed with the patient. Concentration (Mg/Ml): 40.0 Kenalog Preparation: kenalog Add Option For Additional Mediation: No Administered By (Optional): SEDRICK Detail Level: None Total Volume (Ccs): 2 [[id number]]

## 2023-05-04 ENCOUNTER — APPOINTMENT (OUTPATIENT)
Dept: ANTEPARTUM | Facility: CLINIC | Age: 33
End: 2023-05-04

## 2024-07-17 NOTE — OB PROVIDER DELIVERY SUMMARY - NSANESTHESIALABOR_OBGYN_ALL_OB
I called and left a voicemail with Dr. Anand office regarding requesting additional records. I left our call back number and fax number also. I have spoken with the patient's daughter this morning to inform her that we are waiting for her additional studies to be sent over and we will be back in touch as soon as they are received and reviewed. The daughter verbalized understanding.     ----- Message from INGRID Burger sent at 7/17/2024  5:38 AM CDT -----  Please call dr anand office.  Let them know they sent office notes but they did not sent the studies.  We need the full jenni and venous reflux study.  See if they can send today.  Please let the patient know we have most of the records but are still awaiting some of the records and we will be in touch soon   Epidural
